# Patient Record
Sex: FEMALE | Race: WHITE | NOT HISPANIC OR LATINO
[De-identification: names, ages, dates, MRNs, and addresses within clinical notes are randomized per-mention and may not be internally consistent; named-entity substitution may affect disease eponyms.]

---

## 2019-04-05 PROBLEM — Z00.00 ENCOUNTER FOR PREVENTIVE HEALTH EXAMINATION: Status: ACTIVE | Noted: 2019-04-05

## 2019-04-11 ENCOUNTER — APPOINTMENT (OUTPATIENT)
Dept: SURGERY | Facility: CLINIC | Age: 49
End: 2019-04-11
Payer: COMMERCIAL

## 2019-04-11 VITALS
DIASTOLIC BLOOD PRESSURE: 68 MMHG | SYSTOLIC BLOOD PRESSURE: 110 MMHG | HEIGHT: 62 IN | WEIGHT: 103 LBS | BODY MASS INDEX: 18.95 KG/M2

## 2019-04-11 DIAGNOSIS — Z87.59 PERSONAL HISTORY OF OTHER COMPLICATIONS OF PREGNANCY, CHILDBIRTH AND THE PUERPERIUM: ICD-10-CM

## 2019-04-11 PROCEDURE — 99202 OFFICE O/P NEW SF 15 MIN: CPT

## 2019-04-12 PROBLEM — Z87.59 HISTORY OF SPONTANEOUS ABORTION: Status: RESOLVED | Noted: 2019-04-11 | Resolved: 2019-04-12

## 2019-04-12 NOTE — HISTORY OF PRESENT ILLNESS
[de-identified] : The patient is referred for evaluation of a cyst identified in the right breast on a routine screening mammogram and breast sonogram that were done several weeks ago. The patient notes no new symptoms or changes in either breasts and has no prior history of any other breast disorders or breast surgery.\par \par Last GYN examination was in February, menarche was at age 8, first pregnancy was at age 25. She is a  who never nursed were used hormones. Her LMP was 8 years ago when she had a hysterectomy without BSO for uterine fibroids.  She had 3 miscarriages.

## 2019-04-12 NOTE — ASSESSMENT
[FreeTextEntry1] : Nonpalpable simple cyst in the right breast which is a benign finding and not in need any further workup at this time.  The patient was reassured and and she will return to her gynecologist for regular surveillance breast examinations and screening mammograms/sonograms. She will return here as need be. All her questions were answered and she is happy with the assessment and plan.

## 2019-04-12 NOTE — PHYSICAL EXAM
[Normal Thyroid] : the thyroid was normal [Normal Breath Sounds] : Normal breath sounds [Normal Rate and Rhythm] : normal rate and rhythm [Normal Heart Sounds] : normal heart sounds [de-identified] : no adenopathy [de-identified] : The breasts are symmetrical, small and pendulous. They have a dense glandular consistency but no nipple discharge, nipple retraction, or suspicious skin changes. No discrete masses or suspicious areas are palpable in either breast. No axillary adenopathy bilaterally.

## 2019-04-12 NOTE — DATA REVIEWED
[FreeTextEntry1] : Images and reports from March 2019 bilateral mammogram and breast sonogram were reviewed.

## 2020-02-20 DIAGNOSIS — N93.9 ABNORMAL UTERINE AND VAGINAL BLEEDING, UNSPECIFIED: ICD-10-CM

## 2020-02-20 DIAGNOSIS — D25.9 LEIOMYOMA OF UTERUS, UNSPECIFIED: ICD-10-CM

## 2020-02-20 DIAGNOSIS — Z87.42 PERSONAL HISTORY OF OTHER DISEASES OF THE FEMALE GENITAL TRACT: ICD-10-CM

## 2020-02-20 DIAGNOSIS — N95.2 POSTMENOPAUSAL ATROPHIC VAGINITIS: ICD-10-CM

## 2020-02-20 DIAGNOSIS — Z86.2 PERSONAL HISTORY OF DISEASES OF THE BLOOD AND BLOOD-FORMING ORGANS AND CERTAIN DISORDERS INVOLVING THE IMMUNE MECHANISM: ICD-10-CM

## 2020-02-21 ENCOUNTER — APPOINTMENT (OUTPATIENT)
Dept: OBGYN | Facility: CLINIC | Age: 50
End: 2020-02-21
Payer: COMMERCIAL

## 2020-02-21 VITALS — SYSTOLIC BLOOD PRESSURE: 126 MMHG | DIASTOLIC BLOOD PRESSURE: 78 MMHG | BODY MASS INDEX: 18.47 KG/M2 | WEIGHT: 101 LBS

## 2020-02-21 DIAGNOSIS — N60.11 DIFFUSE CYSTIC MASTOPATHY OF RIGHT BREAST: ICD-10-CM

## 2020-02-21 DIAGNOSIS — N83.00 "FOLLICULAR CYST OF OVARY, UNSPECIFIED SIDE": ICD-10-CM

## 2020-02-21 DIAGNOSIS — R92.1 MAMMOGRAPHIC CALCIFICATION FOUND ON DIAGNOSTIC IMAGING OF BREAST: ICD-10-CM

## 2020-02-21 DIAGNOSIS — N60.01 SOLITARY CYST OF RIGHT BREAST: ICD-10-CM

## 2020-02-21 PROCEDURE — 81002 URINALYSIS NONAUTO W/O SCOPE: CPT

## 2020-02-21 PROCEDURE — 76830 TRANSVAGINAL US NON-OB: CPT

## 2020-02-21 PROCEDURE — 99213 OFFICE O/P EST LOW 20 MIN: CPT | Mod: 25

## 2020-02-21 PROCEDURE — 99396 PREV VISIT EST AGE 40-64: CPT

## 2020-02-21 NOTE — PROCEDURE
[Vaginal Pap Smear] : vaginal Pap smear [Liquid Base] : liquid base [Tolerated Well] : the patient tolerated the procedure well [No Complications] : there were no complications [Pelvic Pain] : pelvic pain [Transvaginal Ultrasound] : transvaginal ultrasound [FreeTextEntry6] : left ovary contains a follicle measuring 0.9 x 1.6 cm [FreeTextEntry7] : 2.1 x 3.2 cm [FreeTextEntry8] : 2.1 x 4.5 cm [FreeTextEntry4] : Patient is status post total laparoscopic hysterectomy

## 2020-02-26 DIAGNOSIS — Z86.19 PERSONAL HISTORY OF OTHER INFECTIOUS AND PARASITIC DISEASES: ICD-10-CM

## 2020-12-23 ENCOUNTER — APPOINTMENT (OUTPATIENT)
Dept: OBGYN | Facility: CLINIC | Age: 50
End: 2020-12-23

## 2020-12-23 PROBLEM — Z86.19 HISTORY OF CANDIDAL VULVOVAGINITIS: Status: RESOLVED | Noted: 2020-02-26 | Resolved: 2020-12-23

## 2021-03-05 ENCOUNTER — APPOINTMENT (OUTPATIENT)
Dept: OBGYN | Facility: CLINIC | Age: 51
End: 2021-03-05
Payer: COMMERCIAL

## 2021-03-05 VITALS
WEIGHT: 101 LBS | TEMPERATURE: 98.1 F | HEIGHT: 62 IN | SYSTOLIC BLOOD PRESSURE: 118 MMHG | BODY MASS INDEX: 18.58 KG/M2 | DIASTOLIC BLOOD PRESSURE: 73 MMHG

## 2021-03-05 DIAGNOSIS — Z01.419 ENCOUNTER FOR GYNECOLOGICAL EXAMINATION (GENERAL) (ROUTINE) W/OUT ABNORMAL FINDINGS: ICD-10-CM

## 2021-03-05 PROCEDURE — 99396 PREV VISIT EST AGE 40-64: CPT

## 2021-03-05 PROCEDURE — 99072 ADDL SUPL MATRL&STAF TM PHE: CPT

## 2021-03-05 NOTE — PHYSICAL EXAM
[Appropriately responsive] : appropriately responsive [Alert] : alert [No Acute Distress] : no acute distress [No Lymphadenopathy] : no lymphadenopathy [Regular Rate Rhythm] : regular rate rhythm [No Murmurs] : no murmurs [Clear to Auscultation B/L] : clear to auscultation bilaterally [Soft] : soft [Non-tender] : non-tender [Non-distended] : non-distended [No HSM] : No HSM [No Lesions] : no lesions [No Mass] : no mass [Oriented x3] : oriented x3 [Labia Majora] : normal [Labia Minora] : normal [Normal] : normal [Absent] : absent [Uterine Adnexae] : non-palpable

## 2021-03-05 NOTE — DISCUSSION/SUMMARY
[FreeTextEntry1] : Pap done\par Self breast exam stressed\par Prescribed yearly bilateral screening mammogram and bilateral breast ultrasound\par Follow-up with breast specialist as indicated\par Follow-up yearly or as needed

## 2021-03-05 NOTE — HISTORY OF PRESENT ILLNESS
[FreeTextEntry1] : Patient is 50 years old para 3-0-0-3 last menstrual period 2011 at which time she underwent a total laparoscopic hysterectomy.  She has a history of a stable right breast cyst approximately 0.8 cm in size located at 11:00 6 cm from the nipple.  She refuses a breast examination today

## 2021-10-10 LAB
GLUCOSE UR-MCNC: NORMAL
HGB UR QL STRIP.AUTO: NORMAL
LEUKOCYTE ESTERASE UR QL STRIP: NORMAL
PROT UR STRIP-MCNC: NORMAL

## 2022-05-10 ENCOUNTER — APPOINTMENT (OUTPATIENT)
Dept: OBGYN | Facility: CLINIC | Age: 52
End: 2022-05-10

## 2022-08-04 ENCOUNTER — APPOINTMENT (OUTPATIENT)
Dept: CARDIOLOGY | Facility: CLINIC | Age: 52
End: 2022-08-04

## 2022-08-04 VITALS
BODY MASS INDEX: 18.77 KG/M2 | DIASTOLIC BLOOD PRESSURE: 60 MMHG | SYSTOLIC BLOOD PRESSURE: 90 MMHG | WEIGHT: 102 LBS | HEIGHT: 62 IN

## 2022-08-04 DIAGNOSIS — Z80.3 FAMILY HISTORY OF MALIGNANT NEOPLASM OF BREAST: ICD-10-CM

## 2022-08-04 DIAGNOSIS — Z82.49 FAMILY HISTORY OF ISCHEMIC HEART DISEASE AND OTHER DISEASES OF THE CIRCULATORY SYSTEM: ICD-10-CM

## 2022-08-04 DIAGNOSIS — Z78.9 OTHER SPECIFIED HEALTH STATUS: ICD-10-CM

## 2022-08-04 PROCEDURE — 93000 ELECTROCARDIOGRAM COMPLETE: CPT

## 2022-08-04 PROCEDURE — 99203 OFFICE O/P NEW LOW 30 MIN: CPT | Mod: 25

## 2022-08-04 RX ORDER — FLUCONAZOLE 150 MG/1
150 TABLET ORAL
Qty: 1 | Refills: 0 | Status: DISCONTINUED | COMMUNITY
Start: 2020-02-26 | End: 2022-08-04

## 2022-08-04 NOTE — ASSESSMENT
[FreeTextEntry1] : Incomplete RBBB: Patient had after her hysterectomy with large blood loss. Unclear if MI in past. \par -Will check TTE. \par \par Varicose veins: \par -Has had evaluation in the past without intervention.\par -Continue with compression socks/stockings PRN.\par -Will defer imaging at this time. Patient wishes to wait. \par \par Follow up in 4 months.

## 2022-08-04 NOTE — REASON FOR VISIT
[Other: ____] : [unfilled] [FreeTextEntry1] : Diagnostic Tests:\par ---------------------\par EKG:\par 08/04/22-NSR. Incomplete RBBB. Borderline RAD.

## 2022-08-04 NOTE — HISTORY OF PRESENT ILLNESS
[FreeTextEntry1] : Ms. Beck is a 52yo F with PMHx of incomplete RBBB and varicose veins who presents to establish care. Her PMD is Dr. Jazlyn Whittaker and previously saw Dr. Gary Harris. She wishes to change her primary care. She has been feeling well. She was told she had an incomplete RBBB in the past and possible "leaky heart valve". She denies symptoms of CP, SOB, palpitations, LE edema. She at times will have varicose vein on left calf which bothers her, but will use compression socks which help with symptoms.

## 2022-08-16 ENCOUNTER — OUTPATIENT (OUTPATIENT)
Dept: OUTPATIENT SERVICES | Facility: HOSPITAL | Age: 52
LOS: 1 days | Discharge: HOME | End: 2022-08-16

## 2022-08-16 DIAGNOSIS — R91.1 SOLITARY PULMONARY NODULE: ICD-10-CM

## 2022-08-16 PROCEDURE — 71250 CT THORAX DX C-: CPT | Mod: 26

## 2022-08-18 ENCOUNTER — TRANSCRIPTION ENCOUNTER (OUTPATIENT)
Age: 52
End: 2022-08-18

## 2022-08-23 ENCOUNTER — APPOINTMENT (OUTPATIENT)
Dept: ORTHOPEDIC SURGERY | Facility: CLINIC | Age: 52
End: 2022-08-23

## 2022-08-23 VITALS — BODY MASS INDEX: 18.77 KG/M2 | HEIGHT: 62 IN | WEIGHT: 102 LBS

## 2022-08-23 DIAGNOSIS — M25.552 PAIN IN LEFT HIP: ICD-10-CM

## 2022-08-23 PROCEDURE — 73502 X-RAY EXAM HIP UNI 2-3 VIEWS: CPT | Mod: LT

## 2022-08-23 PROCEDURE — 99213 OFFICE O/P EST LOW 20 MIN: CPT

## 2022-08-23 NOTE — IMAGING
[de-identified] :  X-rays taken of the patient's left hip with pelvic views in the office today revealed no obvious fractures, subluxations, or dislocations.  Small calcification noted

## 2022-08-23 NOTE — PHYSICAL EXAM
[Left] : left hip [5___] : adduction 5[unfilled]/5 [2+] : posterior tibialis pulse: 2+ [] : no erythema [FreeTextEntry3] :  m [FreeTextEntry9] :  mild limited ROM secondary to pain

## 2022-08-23 NOTE — HISTORY OF PRESENT ILLNESS
[de-identified] :  Patient is a 52-year-old female who reports office for evaluation of her left hip pain that has been aggravating her on and off for the past several weeks.  She states that someone picked her up which caused pain in her left hip.  Running, walking, certain range of motion, and palpating certain areas of the hip aggravate the patient's pain.  Denies any numbness or tingling.

## 2022-08-23 NOTE — DISCUSSION/SUMMARY
[de-identified] :  Patient will take OTC NSAIDs as needed for pain.  The patient was advised to rest/ice the area and can alternate with warm compresses as needed.  Epsom salt and warm water bath was encouraged. \par \par The hip conditioning program from the [employer] was printed and given to the patient so she may try that at home.  MRI ordered for further evaluation.  Patient was advised to call the office a few days after getting the MRI done to discuss results over the phone.\par \par Patient will follow-up in 6-8 weeks for further evaluation.  All of the patient's questions/concerns were answered in detail.  \par \par The patient was seen under the supervision of Dr. Ashby.\par \par

## 2022-08-30 ENCOUNTER — APPOINTMENT (OUTPATIENT)
Dept: CARDIOLOGY | Facility: CLINIC | Age: 52
End: 2022-08-30

## 2022-08-30 PROCEDURE — 93306 TTE W/DOPPLER COMPLETE: CPT

## 2022-10-11 ENCOUNTER — APPOINTMENT (OUTPATIENT)
Dept: ORTHOPEDIC SURGERY | Facility: CLINIC | Age: 52
End: 2022-10-11

## 2022-12-15 PROBLEM — I38 LEAKY HEART VALVE: Status: ACTIVE | Noted: 2019-04-11

## 2022-12-15 PROBLEM — I83.90 VARICOSE VEINS: Status: ACTIVE | Noted: 2022-08-04

## 2022-12-15 PROBLEM — I45.10 INCOMPLETE RBBB: Status: ACTIVE | Noted: 2022-08-04

## 2022-12-15 PROBLEM — R94.31 ABNORMAL EKG: Status: ACTIVE | Noted: 2022-08-04

## 2022-12-15 NOTE — REASON FOR VISIT
[Other: ____] : [unfilled] [FreeTextEntry1] : Diagnostic Tests:\par ---------------------\par EKG:\par 08/04/22-NSR. Incomplete RBBB. Borderline RAD. \par \par TTE\par 08/30/2022 - LVEF 63%, mild AV leaflets thickening

## 2022-12-21 ENCOUNTER — APPOINTMENT (OUTPATIENT)
Dept: CARDIOLOGY | Facility: CLINIC | Age: 52
End: 2022-12-21

## 2022-12-21 DIAGNOSIS — I83.90 ASYMPTOMATIC VARICOSE VEINS OF UNSPECIFIED LOWER EXTREMITY: ICD-10-CM

## 2022-12-21 DIAGNOSIS — I45.10 UNSPECIFIED RIGHT BUNDLE-BRANCH BLOCK: ICD-10-CM

## 2022-12-21 DIAGNOSIS — R94.31 ABNORMAL ELECTROCARDIOGRAM [ECG] [EKG]: ICD-10-CM

## 2022-12-21 DIAGNOSIS — I38 ENDOCARDITIS, VALVE UNSPECIFIED: ICD-10-CM

## 2023-01-18 ENCOUNTER — APPOINTMENT (OUTPATIENT)
Dept: ORTHOPEDIC SURGERY | Facility: CLINIC | Age: 53
End: 2023-01-18
Payer: COMMERCIAL

## 2023-01-18 VITALS — WEIGHT: 102 LBS | HEIGHT: 62 IN | BODY MASS INDEX: 18.77 KG/M2

## 2023-01-18 DIAGNOSIS — M79.645 PAIN IN LEFT FINGER(S): ICD-10-CM

## 2023-01-18 PROCEDURE — 73140 X-RAY EXAM OF FINGER(S): CPT | Mod: LT

## 2023-01-18 PROCEDURE — 99213 OFFICE O/P EST LOW 20 MIN: CPT

## 2023-01-18 NOTE — PHYSICAL EXAM
[de-identified] :   Physical exam of left index finger:  She has a healing laceration where the Urgent Care lanced her finger.  There is no signs of drainage, pus, erythema, or warmth to the area.  No disturbance of the nail bed.  She can flex and extend the MCP, PIP, and DIP joint of all 10 digits.  She is sensitive to the touch to the tip of the finger.  Sensory and motor are intact.

## 2023-01-18 NOTE — HISTORY OF PRESENT ILLNESS
[de-identified] : Patient is a 52-year-old female here for evaluation of her left index finger.  She started noticing a cut about a month ago.  She thought it was infected.  She went to an urgent care where they lanced it, but nothing drained out of it.  They did not prescribe an antibiotic.  She then saw her primary care physician who gave her prescription for an antibiotic which she completed.  Since then, she has a healing cut, however she still has some discomfort in the tip of her finger.  She occasionally gets numbness and tingling.

## 2023-01-18 NOTE — ASSESSMENT
[FreeTextEntry1] :   X-rays taken the office today of her left index finger show no acute displaced fractures or bony abnormalities

## 2023-01-18 NOTE — DISCUSSION/SUMMARY
[de-identified] :  At this point, I do not see any signs of an active infection.\par It appears to be that the laceration that was made with the urgent care is healing.\par She is unsure if the foreign body in there.  I do not see any signs without any x-ray.\par I do not see it necessary to be on more antibiotics.\par She will continue to watch and see if there is any improvement over the next few weeks.\par She will see Dr. Lewis in 2 weeks for repeat evaluation.\par All questions were answered today.

## 2023-02-06 ENCOUNTER — APPOINTMENT (OUTPATIENT)
Dept: ORTHOPEDIC SURGERY | Facility: CLINIC | Age: 53
End: 2023-02-06

## 2023-02-14 ENCOUNTER — APPOINTMENT (OUTPATIENT)
Dept: OBGYN | Facility: CLINIC | Age: 53
End: 2023-02-14
Payer: COMMERCIAL

## 2023-02-14 VITALS — BODY MASS INDEX: 18.47 KG/M2 | DIASTOLIC BLOOD PRESSURE: 62 MMHG | SYSTOLIC BLOOD PRESSURE: 100 MMHG | WEIGHT: 101 LBS

## 2023-02-14 DIAGNOSIS — N60.01 SOLITARY CYST OF RIGHT BREAST: ICD-10-CM

## 2023-02-14 DIAGNOSIS — K64.9 UNSPECIFIED HEMORRHOIDS: ICD-10-CM

## 2023-02-14 DIAGNOSIS — Z01.411 ENCOUNTER FOR GYNECOLOGICAL EXAMINATION (GENERAL) (ROUTINE) WITH ABNORMAL FINDINGS: ICD-10-CM

## 2023-02-14 PROCEDURE — 99396 PREV VISIT EST AGE 40-64: CPT

## 2023-02-14 PROCEDURE — 99213 OFFICE O/P EST LOW 20 MIN: CPT | Mod: 25

## 2023-02-14 RX ORDER — HYDROCORTISONE 25 MG/G
2.5 CREAM TOPICAL
Qty: 1 | Refills: 0 | Status: ACTIVE | COMMUNITY
Start: 2023-02-14 | End: 1900-01-01

## 2023-02-14 NOTE — HISTORY OF PRESENT ILLNESS
[FreeTextEntry1] : Patient is 52 years old para 3-0-0-3 last menstrual period 2011 at which time she underwent a total laparoscopic hysterectomy.  She has a history of a stable right cyst last measuring 1.1 cm located at 11:00 6 cm from the nipple.  She refuses breast examination today.  Patient complains of external hemorrhoid she was previously evaluated by colorectal surgeon Dr. Chris Vu.  She has tried over-the-counter hemorrhoid cream and is requesting treatment with prescription strength hemorrhoid cream

## 2023-02-14 NOTE — PHYSICAL EXAM
[Appropriately responsive] : appropriately responsive [Alert] : alert [No Acute Distress] : no acute distress [No Lymphadenopathy] : no lymphadenopathy [Regular Rate Rhythm] : regular rate rhythm [No Murmurs] : no murmurs [Clear to Auscultation B/L] : clear to auscultation bilaterally [Soft] : soft [Non-tender] : non-tender [Non-distended] : non-distended [No HSM] : No HSM [No Lesions] : no lesions [No Mass] : no mass [Oriented x3] : oriented x3 [Labia Majora] : normal [Labia Minora] : normal [Normal] : normal [Absent] : absent [Uterine Adnexae] : non-palpable [External Hemorrhoid] : external hemorrhoid

## 2023-02-14 NOTE — DISCUSSION/SUMMARY
[FreeTextEntry1] : Pap done\par Self breast exam stressed\par Prescribed bilateral screening mammogram and bilateral breast ultrasound\par Prescribed Anusol HC 2.5%\par Advised follow-up with colorectal surgeon as needed\par Follow-up yearly or as needed

## 2024-07-16 ENCOUNTER — APPOINTMENT (OUTPATIENT)
Dept: OBGYN | Facility: CLINIC | Age: 54
End: 2024-07-16
Payer: COMMERCIAL

## 2024-07-16 VITALS
BODY MASS INDEX: 18.58 KG/M2 | HEIGHT: 62 IN | HEART RATE: 84 BPM | WEIGHT: 101 LBS | DIASTOLIC BLOOD PRESSURE: 74 MMHG | SYSTOLIC BLOOD PRESSURE: 115 MMHG

## 2024-07-16 DIAGNOSIS — Z01.419 ENCOUNTER FOR GYNECOLOGICAL EXAMINATION (GENERAL) (ROUTINE) W/OUT ABNORMAL FINDINGS: ICD-10-CM

## 2024-07-16 PROCEDURE — 99396 PREV VISIT EST AGE 40-64: CPT | Mod: 25

## 2024-07-16 PROCEDURE — 99459 PELVIC EXAMINATION: CPT

## 2025-08-05 ENCOUNTER — APPOINTMENT (OUTPATIENT)
Dept: OBGYN | Facility: CLINIC | Age: 55
End: 2025-08-05
Payer: COMMERCIAL

## 2025-08-05 VITALS
WEIGHT: 101 LBS | HEIGHT: 62 IN | BODY MASS INDEX: 18.58 KG/M2 | SYSTOLIC BLOOD PRESSURE: 112 MMHG | DIASTOLIC BLOOD PRESSURE: 72 MMHG

## 2025-08-05 DIAGNOSIS — Z01.419 ENCOUNTER FOR GYNECOLOGICAL EXAMINATION (GENERAL) (ROUTINE) W/OUT ABNORMAL FINDINGS: ICD-10-CM

## 2025-08-05 PROCEDURE — 99396 PREV VISIT EST AGE 40-64: CPT | Mod: 25

## 2025-08-05 PROCEDURE — 99459 PELVIC EXAMINATION: CPT | Mod: NC
